# Patient Record
Sex: FEMALE
[De-identification: names, ages, dates, MRNs, and addresses within clinical notes are randomized per-mention and may not be internally consistent; named-entity substitution may affect disease eponyms.]

---

## 2024-02-20 ENCOUNTER — RESULT CHARGE (OUTPATIENT)
Age: 24
End: 2024-02-20

## 2024-02-20 ENCOUNTER — APPOINTMENT (OUTPATIENT)
Dept: OBGYN | Facility: CLINIC | Age: 24
End: 2024-02-20
Payer: MEDICAID

## 2024-02-20 VITALS
DIASTOLIC BLOOD PRESSURE: 73 MMHG | SYSTOLIC BLOOD PRESSURE: 123 MMHG | WEIGHT: 108 LBS | HEIGHT: 61 IN | BODY MASS INDEX: 20.39 KG/M2

## 2024-02-20 DIAGNOSIS — Z32.00 ENCOUNTER FOR PREGNANCY TEST, RESULT UNKNOWN: ICD-10-CM

## 2024-02-20 LAB
BILIRUB UR QL STRIP: NORMAL
GLUCOSE UR-MCNC: NORMAL
HCG UR QL: 0.2 EU/DL
HCG UR QL: POSITIVE
HGB UR QL STRIP.AUTO: NORMAL
KETONES UR-MCNC: NORMAL
LEUKOCYTE ESTERASE UR QL STRIP: NORMAL
NITRITE UR QL STRIP: NORMAL
PH UR STRIP: 7.5
PROT UR STRIP-MCNC: NORMAL
QUALITY CONTROL: YES
SP GR UR STRIP: 1.02

## 2024-02-20 PROCEDURE — 81003 URINALYSIS AUTO W/O SCOPE: CPT | Mod: QW

## 2024-02-20 PROCEDURE — 76801 OB US < 14 WKS SINGLE FETUS: CPT

## 2024-02-20 PROCEDURE — 99203 OFFICE O/P NEW LOW 30 MIN: CPT | Mod: 25

## 2024-02-20 PROCEDURE — 99385 PREV VISIT NEW AGE 18-39: CPT

## 2024-02-20 PROCEDURE — 81025 URINE PREGNANCY TEST: CPT

## 2024-02-20 PROCEDURE — 76815 OB US LIMITED FETUS(S): CPT

## 2024-02-20 NOTE — PROCEDURE
[Transabdominal OB Sonogram] : Transabdominal OB Sonogram [Intrauterine Pregnancy] : intrauterine pregnancy [Yolk Sac] : yolk sac present [Fetal Heart] : fetal heart present

## 2024-02-22 ENCOUNTER — NON-APPOINTMENT (OUTPATIENT)
Age: 24
End: 2024-02-22

## 2024-02-22 LAB
25(OH)D3 SERPL-MCNC: 27 NG/ML
ABO + RH PNL BLD: NORMAL
BACTERIA UR CULT: NORMAL
BASOPHILS # BLD AUTO: 0.05 K/UL
BASOPHILS NFR BLD AUTO: 0.5 %
BLD GP AB SCN SERPL QL: NORMAL
CMV IGG SERPL QL: 5 U/ML
CMV IGG SERPL-IMP: POSITIVE
CMV IGM SERPL QL: <8 AU/ML
CMV IGM SERPL QL: NEGATIVE
EOSINOPHIL # BLD AUTO: 0.02 K/UL
EOSINOPHIL NFR BLD AUTO: 0.2 %
ESTIMATED AVERAGE GLUCOSE: 97 MG/DL
FERRITIN SERPL-MCNC: 122 NG/ML
FOLATE SERPL-MCNC: >20 NG/ML
HBA1C MFR BLD HPLC: 5 %
HBV SURFACE AG SER QL: NONREACTIVE
HCT VFR BLD CALC: 38.9 %
HCV AB SER QL: NONREACTIVE
HCV S/CO RATIO: 0.11 S/CO
HGB A MFR BLD: 97.6 %
HGB A2 MFR BLD: 2.4 %
HGB BLD-MCNC: 12.9 G/DL
HGB FRACT BLD-IMP: NORMAL
HIV1+2 AB SPEC QL IA.RAPID: NONREACTIVE
IMM GRANULOCYTES NFR BLD AUTO: 0.3 %
IRON SATN MFR SERPL: 32 %
IRON SERPL-MCNC: 108 UG/DL
LEAD BLD-MCNC: <1 UG/DL
LYMPHOCYTES # BLD AUTO: 2.04 K/UL
LYMPHOCYTES NFR BLD AUTO: 20.1 %
MAN DIFF?: NORMAL
MCHC RBC-ENTMCNC: 27.7 PG
MCHC RBC-ENTMCNC: 33.2 GM/DL
MCV RBC AUTO: 83.7 FL
MEV IGG FLD QL IA: >300 AU/ML
MEV IGG+IGM SER-IMP: POSITIVE
MONOCYTES # BLD AUTO: 0.62 K/UL
MONOCYTES NFR BLD AUTO: 6.1 %
MUV AB SER-ACNC: POSITIVE
MUV IGG SER QL IA: 126 AU/ML
NEUTROPHILS # BLD AUTO: 7.38 K/UL
NEUTROPHILS NFR BLD AUTO: 72.8 %
PLATELET # BLD AUTO: 248 K/UL
RBC # BLD: 4.65 M/UL
RBC # FLD: 13.4 %
RUBV IGG FLD-ACNC: 13.5 INDEX
RUBV IGG SER-IMP: POSITIVE
T PALLIDUM AB SER QL IA: NEGATIVE
TIBC SERPL-MCNC: 333 UG/DL
TSH SERPL-ACNC: 1.01 UIU/ML
UIBC SERPL-MCNC: 225 UG/DL
VIT B12 SERPL-MCNC: 360 PG/ML
VZV AB TITR SER: POSITIVE
VZV IGG SER IF-ACNC: 863.7 INDEX
WBC # FLD AUTO: 10.14 K/UL

## 2024-02-22 RX ORDER — UBIDECARENONE/VIT E ACET 100MG-5
25 MCG CAPSULE ORAL
Qty: 30 | Refills: 5 | Status: ACTIVE | COMMUNITY
Start: 2024-02-22 | End: 1900-01-01

## 2024-02-23 ENCOUNTER — NON-APPOINTMENT (OUTPATIENT)
Age: 24
End: 2024-02-23

## 2024-02-23 LAB
B19V IGG SER QL IA: 6.89 INDEX
B19V IGG+IGM SER-IMP: NORMAL
B19V IGG+IGM SER-IMP: POSITIVE
B19V IGM FLD-ACNC: 0.14 INDEX
B19V IGM SER-ACNC: NEGATIVE

## 2024-02-27 NOTE — PLAN
[FreeTextEntry1] : crl on transabd sono 8.3 weeks edc 9/27/2024 to use vit to use basa no genetics no flu shot ' to rt in 1 month  to do 1hr gct at 13 weeks  discussed with pt close preg ie got preg when baby 6 months old - may feel sig lower abd and pelvic pressure and back ache

## 2024-02-27 NOTE — HISTORY OF PRESENT ILLNESS
[FreeTextEntry1] : 23 years old p1001 -  7.5lbs baby is 8mnths old  family hx - maternal gf brain tumor,paternal gm breast pt hx - neg  on pnv and folic acid pt got pregnant while nursing and was getting reg cycles - q4 weeks her lm summary of the first preg - at the 6month sugar test the 1 hr was elevated , pt couldnt tolerate the 3 hr so she was told to do finger sticks the numbers wnl , pt was watching diet- pt gained 20lbs in the pregnancy had a hematoma- at the beg only for very short time pt started having PIH at 38-39 weeks - induced 3 days before edc no pp hypertension, had fever in labor, baby went to nicu , strep neg  pt had vaccuum delivery

## 2024-03-29 ENCOUNTER — APPOINTMENT (OUTPATIENT)
Dept: OBGYN | Facility: CLINIC | Age: 24
End: 2024-03-29
Payer: MEDICAID

## 2024-03-29 VITALS — WEIGHT: 114 LBS | DIASTOLIC BLOOD PRESSURE: 82 MMHG | SYSTOLIC BLOOD PRESSURE: 125 MMHG

## 2024-03-29 LAB
BILIRUB UR QL STRIP: NORMAL
GLUCOSE UR-MCNC: NORMAL
HCG UR QL: 0.2 EU/DL
HGB UR QL STRIP.AUTO: NORMAL
KETONES UR-MCNC: NORMAL
LEUKOCYTE ESTERASE UR QL STRIP: NORMAL
NITRITE UR QL STRIP: NORMAL
PH UR STRIP: 7.5
PROT UR STRIP-MCNC: NORMAL
SP GR UR STRIP: 1.02

## 2024-03-29 PROCEDURE — 81003 URINALYSIS AUTO W/O SCOPE: CPT | Mod: NC,QW

## 2024-03-29 PROCEDURE — 0501F PRENATAL FLOW SHEET: CPT

## 2024-05-23 ENCOUNTER — APPOINTMENT (OUTPATIENT)
Dept: OBGYN | Facility: CLINIC | Age: 24
End: 2024-05-23
Payer: MEDICAID

## 2024-05-23 ENCOUNTER — TRANSCRIPTION ENCOUNTER (OUTPATIENT)
Age: 24
End: 2024-05-23

## 2024-05-23 VITALS — DIASTOLIC BLOOD PRESSURE: 77 MMHG | SYSTOLIC BLOOD PRESSURE: 126 MMHG | WEIGHT: 124 LBS

## 2024-05-23 DIAGNOSIS — Z34.90 ENCOUNTER FOR SUPERVISION OF NORMAL PREGNANCY, UNSPECIFIED, UNSPECIFIED TRIMESTER: ICD-10-CM

## 2024-05-23 LAB
BILIRUB UR QL STRIP: NORMAL
GLUCOSE UR-MCNC: NORMAL
HCG UR QL: 0.2 EU/DL
HGB UR QL STRIP.AUTO: NORMAL
KETONES UR-MCNC: NORMAL
LEUKOCYTE ESTERASE UR QL STRIP: NORMAL
NITRITE UR QL STRIP: NORMAL
PH UR STRIP: 6
PROT UR STRIP-MCNC: NORMAL
SP GR UR STRIP: 1.02

## 2024-05-23 PROCEDURE — 81003 URINALYSIS AUTO W/O SCOPE: CPT | Mod: NC,QW

## 2024-05-23 PROCEDURE — 0502F SUBSEQUENT PRENATAL CARE: CPT

## 2024-06-04 ENCOUNTER — NON-APPOINTMENT (OUTPATIENT)
Age: 24
End: 2024-06-04

## 2024-06-20 ENCOUNTER — NON-APPOINTMENT (OUTPATIENT)
Age: 24
End: 2024-06-20

## 2024-06-20 ENCOUNTER — APPOINTMENT (OUTPATIENT)
Dept: OBGYN | Facility: CLINIC | Age: 24
End: 2024-06-20
Payer: MEDICAID

## 2024-06-20 VITALS
DIASTOLIC BLOOD PRESSURE: 72 MMHG | SYSTOLIC BLOOD PRESSURE: 119 MMHG | HEIGHT: 61 IN | HEART RATE: 98 BPM | BODY MASS INDEX: 24.73 KG/M2 | WEIGHT: 131 LBS

## 2024-06-20 DIAGNOSIS — Z00.00 ENCOUNTER FOR GENERAL ADULT MEDICAL EXAMINATION W/OUT ABNORMAL FINDINGS: ICD-10-CM

## 2024-06-20 LAB
BILIRUB UR QL STRIP: NORMAL
GLUCOSE UR-MCNC: NORMAL
HCG UR QL: 0.2 EU/DL
HGB UR QL STRIP.AUTO: NORMAL
KETONES UR-MCNC: NORMAL
LEUKOCYTE ESTERASE UR QL STRIP: NORMAL
NITRITE UR QL STRIP: NORMAL
PH UR STRIP: 6
PROT UR STRIP-MCNC: NORMAL
SP GR UR STRIP: 1.01

## 2024-06-20 PROCEDURE — 81003 URINALYSIS AUTO W/O SCOPE: CPT | Mod: NC,QW

## 2024-06-20 PROCEDURE — 0502F SUBSEQUENT PRENATAL CARE: CPT

## 2024-06-21 LAB
GLUCOSE 1H P 50 G GLC PO SERPL-MCNC: 72 MG/DL
HCT VFR BLD CALC: 35.8 %
HGB BLD-MCNC: 11.4 G/DL
MCHC RBC-ENTMCNC: 28.6 PG
MCHC RBC-ENTMCNC: 31.8 GM/DL
MCV RBC AUTO: 89.9 FL
PLATELET # BLD AUTO: 155 K/UL
RBC # BLD: 3.98 M/UL
RBC # FLD: 14 %
WBC # FLD AUTO: 9.3 K/UL

## 2024-07-10 ENCOUNTER — NON-APPOINTMENT (OUTPATIENT)
Age: 24
End: 2024-07-10

## 2024-07-11 ENCOUNTER — APPOINTMENT (OUTPATIENT)
Dept: OBGYN | Facility: CLINIC | Age: 24
End: 2024-07-11
Payer: MEDICAID

## 2024-07-11 VITALS — SYSTOLIC BLOOD PRESSURE: 122 MMHG | WEIGHT: 131 LBS | DIASTOLIC BLOOD PRESSURE: 79 MMHG

## 2024-07-11 LAB
GLUCOSE UR-MCNC: NORMAL
HCG UR QL: 0.2 EU/DL
HGB UR QL STRIP.AUTO: NORMAL
KETONES UR-MCNC: NORMAL
LEUKOCYTE ESTERASE UR QL STRIP: NORMAL
NITRITE UR QL STRIP: NORMAL
PH UR STRIP: 7
PROT UR STRIP-MCNC: NORMAL
SP GR UR STRIP: 1.01

## 2024-07-11 PROCEDURE — 0502F SUBSEQUENT PRENATAL CARE: CPT

## 2024-07-11 PROCEDURE — 81003 URINALYSIS AUTO W/O SCOPE: CPT | Mod: NC,QW

## 2024-08-26 ENCOUNTER — RESULT CHARGE (OUTPATIENT)
Age: 24
End: 2024-08-26

## 2024-08-26 ENCOUNTER — APPOINTMENT (OUTPATIENT)
Dept: OBGYN | Facility: CLINIC | Age: 24
End: 2024-08-26
Payer: MEDICAID

## 2024-08-26 VITALS — WEIGHT: 133 LBS | HEART RATE: 118 BPM | SYSTOLIC BLOOD PRESSURE: 134 MMHG | DIASTOLIC BLOOD PRESSURE: 84 MMHG

## 2024-08-26 DIAGNOSIS — Z34.90 ENCOUNTER FOR SUPERVISION OF NORMAL PREGNANCY, UNSPECIFIED, UNSPECIFIED TRIMESTER: ICD-10-CM

## 2024-08-26 LAB
BILIRUB UR QL STRIP: NORMAL
GLUCOSE UR-MCNC: NORMAL
HCG UR QL: 0.2 EU/DL
HGB UR QL STRIP.AUTO: NORMAL
KETONES UR-MCNC: NORMAL
LEUKOCYTE ESTERASE UR QL STRIP: NORMAL
NITRITE UR QL STRIP: NORMAL
PH UR STRIP: 6
PROT UR STRIP-MCNC: NORMAL
SP GR UR STRIP: 1

## 2024-08-26 PROCEDURE — 0502F SUBSEQUENT PRENATAL CARE: CPT

## 2024-08-26 PROCEDURE — 81003 URINALYSIS AUTO W/O SCOPE: CPT | Mod: NC,QW

## 2024-09-05 ENCOUNTER — APPOINTMENT (OUTPATIENT)
Dept: OBGYN | Facility: CLINIC | Age: 24
End: 2024-09-05
Payer: MEDICAID

## 2024-09-05 VITALS
BODY MASS INDEX: 26.43 KG/M2 | HEIGHT: 61 IN | DIASTOLIC BLOOD PRESSURE: 85 MMHG | HEART RATE: 111 BPM | SYSTOLIC BLOOD PRESSURE: 125 MMHG | WEIGHT: 140 LBS

## 2024-09-05 LAB
BILIRUB UR QL STRIP: NORMAL
GLUCOSE UR-MCNC: NORMAL
HCG UR QL: 0.2 EU/DL
HGB UR QL STRIP.AUTO: NORMAL
KETONES UR-MCNC: NORMAL
LEUKOCYTE ESTERASE UR QL STRIP: NORMAL
NITRITE UR QL STRIP: NORMAL
PH UR STRIP: 6
PROT UR STRIP-MCNC: NORMAL
SP GR UR STRIP: <=1.005

## 2024-09-05 PROCEDURE — 0502F SUBSEQUENT PRENATAL CARE: CPT

## 2024-09-05 PROCEDURE — 81003 URINALYSIS AUTO W/O SCOPE: CPT | Mod: NC,QW

## 2024-09-06 LAB
BASOPHILS # BLD AUTO: 0.05 K/UL
BASOPHILS NFR BLD AUTO: 0.4 %
EOSINOPHIL # BLD AUTO: 0.19 K/UL
EOSINOPHIL NFR BLD AUTO: 1.6 %
HCT VFR BLD CALC: 36.1 %
HGB BLD-MCNC: 11.3 G/DL
HIV1+2 AB SPEC QL IA.RAPID: NONREACTIVE
IMM GRANULOCYTES NFR BLD AUTO: 0.3 %
LYMPHOCYTES # BLD AUTO: 2.39 K/UL
LYMPHOCYTES NFR BLD AUTO: 20.4 %
MAN DIFF?: NORMAL
MCHC RBC-ENTMCNC: 28.4 PG
MCHC RBC-ENTMCNC: 31.3 GM/DL
MCV RBC AUTO: 90.7 FL
MONOCYTES # BLD AUTO: 0.79 K/UL
MONOCYTES NFR BLD AUTO: 6.7 %
NEUTROPHILS # BLD AUTO: 8.29 K/UL
NEUTROPHILS NFR BLD AUTO: 70.6 %
PLATELET # BLD AUTO: 137 K/UL
RBC # BLD: 3.98 M/UL
RBC # FLD: 13.5 %
WBC # FLD AUTO: 11.74 K/UL

## 2024-09-07 LAB — T PALLIDUM AB SER QL IA: NEGATIVE

## 2024-09-08 LAB — B-HEM STREP SPEC QL CULT: NORMAL

## 2024-09-12 ENCOUNTER — APPOINTMENT (OUTPATIENT)
Dept: OBGYN | Facility: CLINIC | Age: 24
End: 2024-09-12
Payer: MEDICAID

## 2024-09-12 VITALS
HEART RATE: 105 BPM | WEIGHT: 140 LBS | SYSTOLIC BLOOD PRESSURE: 120 MMHG | BODY MASS INDEX: 26.43 KG/M2 | HEIGHT: 61 IN | DIASTOLIC BLOOD PRESSURE: 77 MMHG

## 2024-09-12 PROCEDURE — 76816 OB US FOLLOW-UP PER FETUS: CPT

## 2024-09-12 PROCEDURE — 81003 URINALYSIS AUTO W/O SCOPE: CPT | Mod: NC,QW

## 2024-09-12 PROCEDURE — 76819 FETAL BIOPHYS PROFIL W/O NST: CPT | Mod: 59

## 2024-09-12 PROCEDURE — 0502F SUBSEQUENT PRENATAL CARE: CPT

## 2024-09-13 LAB
ALBUMIN SERPL ELPH-MCNC: 3.9 G/DL
ALP BLD-CCNC: 155 U/L
ALT SERPL-CCNC: 12 U/L
ANION GAP SERPL CALC-SCNC: 13 MMOL/L
AST SERPL-CCNC: 20 U/L
BASOPHILS # BLD AUTO: 0.03 K/UL
BASOPHILS NFR BLD AUTO: 0.3 %
BILIRUB SERPL-MCNC: 0.3 MG/DL
BUN SERPL-MCNC: 8 MG/DL
CALCIUM SERPL-MCNC: 9.2 MG/DL
CHLORIDE SERPL-SCNC: 104 MMOL/L
CO2 SERPL-SCNC: 21 MMOL/L
CREAT SERPL-MCNC: 0.44 MG/DL
CREAT SPEC-SCNC: 83 MG/DL
CREAT/PROT UR: 0.1 RATIO
EGFR: 139 ML/MIN/1.73M2
EOSINOPHIL # BLD AUTO: 0.1 K/UL
EOSINOPHIL NFR BLD AUTO: 1 %
GLUCOSE SERPL-MCNC: 89 MG/DL
HCT VFR BLD CALC: 37.9 %
HGB BLD-MCNC: 11.7 G/DL
IMM GRANULOCYTES NFR BLD AUTO: 0.5 %
LYMPHOCYTES # BLD AUTO: 1.91 K/UL
LYMPHOCYTES NFR BLD AUTO: 19.8 %
MAN DIFF?: NORMAL
MCHC RBC-ENTMCNC: 28.7 PG
MCHC RBC-ENTMCNC: 30.9 GM/DL
MCV RBC AUTO: 93.1 FL
MONOCYTES # BLD AUTO: 0.72 K/UL
MONOCYTES NFR BLD AUTO: 7.5 %
NEUTROPHILS # BLD AUTO: 6.84 K/UL
NEUTROPHILS NFR BLD AUTO: 70.9 %
PLATELET # BLD AUTO: 134 K/UL
POTASSIUM SERPL-SCNC: 3.7 MMOL/L
PROT SERPL-MCNC: 6.7 G/DL
PROT UR-MCNC: 10 MG/DL
RBC # BLD: 4.07 M/UL
RBC # FLD: 13.9 %
SODIUM SERPL-SCNC: 138 MMOL/L
WBC # FLD AUTO: 9.65 K/UL

## 2024-09-19 ENCOUNTER — APPOINTMENT (OUTPATIENT)
Dept: OBGYN | Facility: CLINIC | Age: 24
End: 2024-09-19
Payer: MEDICAID

## 2024-09-19 ENCOUNTER — RESULT CHARGE (OUTPATIENT)
Age: 24
End: 2024-09-19

## 2024-09-19 VITALS
HEIGHT: 61 IN | WEIGHT: 140 LBS | SYSTOLIC BLOOD PRESSURE: 129 MMHG | HEART RATE: 120 BPM | DIASTOLIC BLOOD PRESSURE: 82 MMHG | BODY MASS INDEX: 26.43 KG/M2

## 2024-09-19 LAB
BILIRUB UR QL STRIP: NORMAL
GLUCOSE UR-MCNC: NORMAL
HCG UR QL: 0.2 EU/DL
HGB UR QL STRIP.AUTO: NORMAL
KETONES UR-MCNC: NORMAL
LEUKOCYTE ESTERASE UR QL STRIP: NORMAL
NITRITE UR QL STRIP: NORMAL
PH UR STRIP: 6.5
PROT UR STRIP-MCNC: NORMAL
SP GR UR STRIP: 1.01

## 2024-09-19 PROCEDURE — 76819 FETAL BIOPHYS PROFIL W/O NST: CPT

## 2024-09-19 PROCEDURE — 0502F SUBSEQUENT PRENATAL CARE: CPT

## 2024-09-19 PROCEDURE — 81003 URINALYSIS AUTO W/O SCOPE: CPT | Mod: NC,QW

## 2024-09-23 ENCOUNTER — INPATIENT (INPATIENT)
Facility: HOSPITAL | Age: 24
LOS: 0 days | Discharge: HOME | DRG: 566 | End: 2024-09-24
Attending: SPECIALIST | Admitting: SPECIALIST
Payer: MEDICAID

## 2024-09-23 VITALS
SYSTOLIC BLOOD PRESSURE: 124 MMHG | HEART RATE: 115 BPM | TEMPERATURE: 98 F | RESPIRATION RATE: 17 BRPM | DIASTOLIC BLOOD PRESSURE: 71 MMHG

## 2024-09-23 DIAGNOSIS — O26.899 OTHER SPECIFIED PREGNANCY RELATED CONDITIONS, UNSPECIFIED TRIMESTER: ICD-10-CM

## 2024-09-23 DIAGNOSIS — O42.92 FULL-TERM PREMATURE RUPTURE OF MEMBRANES, UNSPECIFIED AS TO LENGTH OF TIME BETWEEN RUPTURE AND ONSET OF LABOR: ICD-10-CM

## 2024-09-23 LAB
APPEARANCE UR: CLEAR — SIGNIFICANT CHANGE UP
BASOPHILS # BLD AUTO: 0.06 K/UL — SIGNIFICANT CHANGE UP (ref 0–0.2)
BASOPHILS NFR BLD AUTO: 0.5 % — SIGNIFICANT CHANGE UP (ref 0–1)
BILIRUB UR-MCNC: NEGATIVE — SIGNIFICANT CHANGE UP
COLOR SPEC: YELLOW — SIGNIFICANT CHANGE UP
DIFF PNL FLD: NEGATIVE — SIGNIFICANT CHANGE UP
EOSINOPHIL # BLD AUTO: 0.09 K/UL — SIGNIFICANT CHANGE UP (ref 0–0.7)
EOSINOPHIL NFR BLD AUTO: 0.7 % — SIGNIFICANT CHANGE UP (ref 0–8)
GLUCOSE UR QL: NEGATIVE MG/DL — SIGNIFICANT CHANGE UP
HCT VFR BLD CALC: 35.4 % — LOW (ref 37–47)
HGB BLD-MCNC: 11.9 G/DL — LOW (ref 12–16)
IMM GRANULOCYTES NFR BLD AUTO: 0.4 % — HIGH (ref 0.1–0.3)
KETONES UR-MCNC: NEGATIVE MG/DL — SIGNIFICANT CHANGE UP
L&D DRUG SCREEN, URINE: SIGNIFICANT CHANGE UP
LEUKOCYTE ESTERASE UR-ACNC: ABNORMAL
LYMPHOCYTES # BLD AUTO: 2.74 K/UL — SIGNIFICANT CHANGE UP (ref 1.2–3.4)
LYMPHOCYTES # BLD AUTO: 21.5 % — SIGNIFICANT CHANGE UP (ref 20.5–51.1)
MCHC RBC-ENTMCNC: 29.1 PG — SIGNIFICANT CHANGE UP (ref 27–31)
MCHC RBC-ENTMCNC: 33.6 G/DL — SIGNIFICANT CHANGE UP (ref 32–37)
MCV RBC AUTO: 86.6 FL — SIGNIFICANT CHANGE UP (ref 81–99)
MONOCYTES # BLD AUTO: 0.8 K/UL — HIGH (ref 0.1–0.6)
MONOCYTES NFR BLD AUTO: 6.3 % — SIGNIFICANT CHANGE UP (ref 1.7–9.3)
NEUTROPHILS # BLD AUTO: 9.03 K/UL — HIGH (ref 1.4–6.5)
NEUTROPHILS NFR BLD AUTO: 70.6 % — SIGNIFICANT CHANGE UP (ref 42.2–75.2)
NITRITE UR-MCNC: NEGATIVE — SIGNIFICANT CHANGE UP
NRBC # BLD: 0 /100 WBCS — SIGNIFICANT CHANGE UP (ref 0–0)
PH UR: 6 — SIGNIFICANT CHANGE UP (ref 5–8)
PLATELET # BLD AUTO: 146 K/UL — SIGNIFICANT CHANGE UP (ref 130–400)
PMV BLD: 11.8 FL — HIGH (ref 7.4–10.4)
PRENATAL SYPHILIS TEST: SIGNIFICANT CHANGE UP
PROT UR-MCNC: NEGATIVE MG/DL — SIGNIFICANT CHANGE UP
RBC # BLD: 4.09 M/UL — LOW (ref 4.2–5.4)
RBC # FLD: 13.1 % — SIGNIFICANT CHANGE UP (ref 11.5–14.5)
SP GR SPEC: 1.02 — SIGNIFICANT CHANGE UP (ref 1–1.03)
UROBILINOGEN FLD QL: 0.2 MG/DL — SIGNIFICANT CHANGE UP (ref 0.2–1)
WBC # BLD: 12.77 K/UL — HIGH (ref 4.8–10.8)
WBC # FLD AUTO: 12.77 K/UL — HIGH (ref 4.8–10.8)

## 2024-09-23 PROCEDURE — 59050 FETAL MONITOR W/REPORT: CPT

## 2024-09-23 PROCEDURE — 80354 DRUG SCREENING FENTANYL: CPT

## 2024-09-23 PROCEDURE — 86592 SYPHILIS TEST NON-TREP QUAL: CPT

## 2024-09-23 PROCEDURE — 80307 DRUG TEST PRSMV CHEM ANLYZR: CPT

## 2024-09-23 PROCEDURE — 86900 BLOOD TYPING SEROLOGIC ABO: CPT

## 2024-09-23 PROCEDURE — 59400 OBSTETRICAL CARE: CPT | Mod: U9

## 2024-09-23 PROCEDURE — 86850 RBC ANTIBODY SCREEN: CPT

## 2024-09-23 PROCEDURE — 36415 COLL VENOUS BLD VENIPUNCTURE: CPT

## 2024-09-23 PROCEDURE — 81001 URINALYSIS AUTO W/SCOPE: CPT

## 2024-09-23 PROCEDURE — 85025 COMPLETE CBC W/AUTO DIFF WBC: CPT

## 2024-09-23 PROCEDURE — 86901 BLOOD TYPING SEROLOGIC RH(D): CPT

## 2024-09-23 RX ORDER — SODIUM CHLORIDE 0.9 % (FLUSH) 0.9 %
3 SYRINGE (ML) INJECTION EVERY 8 HOURS
Refills: 0 | Status: DISCONTINUED | OUTPATIENT
Start: 2024-09-23 | End: 2024-09-24

## 2024-09-23 RX ORDER — PRAMOXINE HYDROCHLORIDE 10 MG/ML
1 LOTION TOPICAL EVERY 4 HOURS
Refills: 0 | Status: DISCONTINUED | OUTPATIENT
Start: 2024-09-23 | End: 2024-09-24

## 2024-09-23 RX ORDER — OXYCODONE HYDROCHLORIDE 30 MG/1
5 TABLET, FILM COATED, EXTENDED RELEASE ORAL ONCE
Refills: 0 | Status: DISCONTINUED | OUTPATIENT
Start: 2024-09-23 | End: 2024-09-24

## 2024-09-23 RX ORDER — PRENATAL VIT,CAL 76/IRON/FOLIC 29 MG-1 MG
1 TABLET ORAL DAILY
Refills: 0 | Status: DISCONTINUED | OUTPATIENT
Start: 2024-09-23 | End: 2024-09-24

## 2024-09-23 RX ORDER — OXYCODONE HYDROCHLORIDE 30 MG/1
5 TABLET, FILM COATED, EXTENDED RELEASE ORAL
Refills: 0 | Status: DISCONTINUED | OUTPATIENT
Start: 2024-09-23 | End: 2024-09-24

## 2024-09-23 RX ORDER — ACETAMINOPHEN 325 MG
975 TABLET ORAL
Refills: 0 | Status: DISCONTINUED | OUTPATIENT
Start: 2024-09-23 | End: 2024-09-24

## 2024-09-23 RX ORDER — DIBUCAINE 1 %
1 OINTMENT (GRAM) TOPICAL EVERY 6 HOURS
Refills: 0 | Status: DISCONTINUED | OUTPATIENT
Start: 2024-09-23 | End: 2024-09-24

## 2024-09-23 RX ORDER — ANTI-ITCH CREAM 1 G/100G
1 OINTMENT TOPICAL EVERY 6 HOURS
Refills: 0 | Status: DISCONTINUED | OUTPATIENT
Start: 2024-09-23 | End: 2024-09-24

## 2024-09-23 RX ORDER — DIPHENHYDRAMINE HCL 12.5MG/5ML
25 LIQUID (ML) ORAL EVERY 6 HOURS
Refills: 0 | Status: DISCONTINUED | OUTPATIENT
Start: 2024-09-23 | End: 2024-09-24

## 2024-09-23 RX ORDER — SODIUM CITRATE AND CITRIC ACID MONOHYDRATE 334; 500 MG/5ML; MG/5ML
15 SOLUTION ORAL EVERY 6 HOURS
Refills: 0 | Status: DISCONTINUED | OUTPATIENT
Start: 2024-09-23 | End: 2024-09-23

## 2024-09-23 RX ORDER — SODIUM CHLORIDE IRRIG SOLUTION 0.9 %
1000 SOLUTION, IRRIGATION IRRIGATION
Refills: 0 | Status: DISCONTINUED | OUTPATIENT
Start: 2024-09-23 | End: 2024-09-23

## 2024-09-23 RX ORDER — SOAP/LANOLIN
1 BAR TOPICAL EVERY 4 HOURS
Refills: 0 | Status: DISCONTINUED | OUTPATIENT
Start: 2024-09-23 | End: 2024-09-24

## 2024-09-23 RX ORDER — OXYTOCIN/RINGER'S LACTATE 20/500ML
167 PLASTIC BAG, INJECTION (ML) INTRAVENOUS
Qty: 30 | Refills: 0 | Status: DISCONTINUED | OUTPATIENT
Start: 2024-09-23 | End: 2024-09-24

## 2024-09-23 RX ORDER — TETANUS TOXOID, REDUCED DIPHTHERIA TOXOID AND ACELLULAR PERTUSSIS VACCINE, ADSORBED 5; 2.5; 8; 8; 2.5 [IU]/.5ML; [IU]/.5ML; UG/.5ML; UG/.5ML; UG/.5ML
0.5 SUSPENSION INTRAMUSCULAR ONCE
Refills: 0 | Status: DISCONTINUED | OUTPATIENT
Start: 2024-09-23 | End: 2024-09-24

## 2024-09-23 RX ORDER — MAGNESIUM HYDROXIDE 400 MG/5ML
30 SUSPENSION, ORAL (FINAL DOSE FORM) ORAL
Refills: 0 | Status: DISCONTINUED | OUTPATIENT
Start: 2024-09-23 | End: 2024-09-24

## 2024-09-23 RX ORDER — KETOROLAC TROMETHAMINE 10 MG/1
30 TABLET, FILM COATED ORAL ONCE
Refills: 0 | Status: DISCONTINUED | OUTPATIENT
Start: 2024-09-23 | End: 2024-09-23

## 2024-09-23 RX ADMIN — Medication 975 MILLIGRAM(S): at 21:30

## 2024-09-23 RX ADMIN — Medication 3 MILLILITER(S): at 22:15

## 2024-09-23 RX ADMIN — Medication 975 MILLIGRAM(S): at 20:48

## 2024-09-23 RX ADMIN — Medication 3 MILLILITER(S): at 14:26

## 2024-09-23 NOTE — OB PROVIDER H&P - NSHPLABSRESULTS_GEN_ALL_CORE
Type and Screen: O pos  Antibody screen: neg   Rubella: immune  Varicella: immune   RPR: neg  HbSAg: neg  HIV: NR    Second Trimester:  1 hour Glucola: 72    Third Trimester:  HIV: NR  GBS: neg    sonos:  37w6d- vertex, anterior placenta, MVP 4.5cm, EFW 3236g, BPP 8/8

## 2024-09-23 NOTE — OB PROVIDER H&P - NS_OBGYNHISTORY_OBGYN_ALL_OB_FT
Ob hx:   P1- VAVD, 7-5, IOL 2/2 gHTN    Gyn hx: denies h/o abnormal paps, STIs, ovarian cysts, uterine fibroids, endometriosis

## 2024-09-23 NOTE — PRE-ANESTHESIA EVALUATION ADULT - NSANTHOSAYNRD_GEN_A_CORE
No. AISHWARYA screening performed.  STOP BANG Legend: 0-2 = LOW Risk; 3-4 = INTERMEDIATE Risk; 5-8 = HIGH Risk

## 2024-09-23 NOTE — OB RN DELIVERY SUMMARY - NS_CORDBLDGASA_OBGYN_ALL_OB
Impression: Presence of pseudophakia: Z96.1.  Plan: clear well centered lens Both arterial and venous

## 2024-09-23 NOTE — OB RN PATIENT PROFILE - AS PAIN REST
patient is awake, alert, appears in moderate respiratory distress with accessory muscle use
5 (moderate pain)

## 2024-09-23 NOTE — PROCEDURAL SAFETY CHECKLIST WITH OR WITHOUT SEDATION - NSPX2BRECORDED_GEN_ALL_CORE
Called daughter in request from patient to go over surgery details, for case scheduled on Tuesday May 3rd, to call office back to discuss.    epidural

## 2024-09-23 NOTE — OB PROVIDER H&P - HISTORY OF PRESENT ILLNESS
23yo  at 39w3d, JOSEPH 24 by LMP c/w first trimester sono, presenting to L&D with contractions since 1900 q5-7mins.  Reports good fetal movement.  Denies LOF, VB.  GBS negative.    Pregnancy c/b:    #gestational thrombocytopenia  -plts 134 on     #h/o gHTN in prior pregnancy  -no elevated BPs in this pregnancy  -PELs wnl drawn during prenatal visit

## 2024-09-23 NOTE — OB PROVIDER H&P - NSHPPHYSICALEXAM_GEN_ALL_CORE
Physical exam:    Vital Signs Last 24 Hrs  T(F): 97.9 (23 Sep 2024 03:49), Max: 97.9 (23 Sep 2024 03:19)  HR: 115 (23 Sep 2024 03:49) (115 - 115)  BP: 124/71 (23 Sep 2024 03:49) (124/71 - 124/71)  RR: 17 (23 Sep 2024 03:49) (17 - 17)    Gen: AAOx3, NAD  Abdomen: Soft, nontender, no distension, gravid  Ext: No calf tenderness, no swelling    EFM: 130/mod/+accels  toco: q4-5m  SVE: 5/70/-2  BSS: vertex, posterior placenta  EFW by leopold: 3400

## 2024-09-23 NOTE — OB PROVIDER H&P - NSLOWPPHRISK_OBGYN_A_OB
No previous uterine incision/[place] Pregnancy/Less than or equal to 4 previous vaginal births/No known bleeding disorder/No history of postpartum hemorrhage/No other PPH risks indicated

## 2024-09-23 NOTE — PROCEDURE NOTE - ADDITIONAL PROCEDURE DETAILS
1000 Post Labor  Epidural/ Delivery  Evaluation Note:    Uncomplicated anesthetic for Vaginal Delivery.    Patient seen at bedside. Epidural to be removed by RN before patient transfer.  Patient moving B/L lower extremities.  Motor block appropriate and resolving. Vital Signs are stable. Pain well controlled.     Mirtha Score greater than 9    Mental Status:  __x__ Awake   ___x__ Alert   _____ Drowsy   _____ Sedated    Nausea/Vomiting:  __x__ NO  ______Yes,   See Post - Op Orders          Pain Scale (0-10):  _____    Treatment: __X__ None       Plan: Discharge:   ____Home       __X___Floor     _____Critical Care    _____

## 2024-09-23 NOTE — OB PROVIDER DELIVERY SUMMARY - NSPROVIDERDELIVERYNOTE_OBGYN_ALL_OB_FT
Patient was fully dilated and pushing. Fetal head was OA and restituted to ROT. The anterior and posterior shoulders delivered, followed by the remaining body atraumatically. The  was handed to the mother and RN.  Delayed cord clamping was performed, and then clamped and cut. Cord blood gases collected x2. The placenta delivered intact with membranes. Pitocin was administered. Uterus massaged, fundus found to be firm. Cervix, vagina and perineum inspected a second degree perineal laceration and a right vaginal sulcus tear were noted, repaired using 2-0 chromic in the usual fashion with good hemostasis.     Viable female infant delivered, with APGARs 9/9    Laceration: second degree and right vaginal sulcus tear   ml    Dr. Leo present for the delivery

## 2024-09-23 NOTE — OB RN PATIENT PROFILE - HEIGHT IN CM
154.94 Quality 226: Preventive Care And Screening: Tobacco Use: Screening And Cessation Intervention: Patient screened for tobacco use and is an ex/non-smoker Quality 128: Preventive Care And Screening: Body Mass Index (Bmi) Screening And Follow-Up Plan: BMI is documented within normal parameters and no follow-up plan is required. Quality 131: Pain Assessment And Follow-Up: Pain assessment using a standardized tool is documented as negative, no follow-up plan required Quality 130: Documentation Of Current Medications In The Medical Record: Current Medications Documented Quality 111:Pneumonia Vaccination Status For Older Adults: Pneumococcal Vaccination Previously Received Quality 110: Preventive Care And Screening: Influenza Immunization: Influenza Immunization Administered during Influenza season Detail Level: Detailed

## 2024-09-23 NOTE — OB PROVIDER H&P - ASSESSMENT
24y  at 39w3d, GBS negative , in labor at term.    - admit to L&D  - f/u admission labs  - cont EFM and toco  - pain management PRN  - IV hydration, clear liquid diet  - monitor vitals  - to receive epidural    Discussed with Dr. Leo and Dr. Otto

## 2024-09-23 NOTE — OB RN DELIVERY SUMMARY - NSSELHIDDEN_OBGYN_ALL_OB_FT
[NS_DeliveryAttending1_OBGYN_ALL_OB_FT:[email or web address]=],[NS_DeliveryRN_OBGYN_ALL_OB_FT:[id number]=]

## 2024-09-23 NOTE — OB RN PATIENT PROFILE - FALL HARM RISK - UNIVERSAL INTERVENTIONS
Bed in lowest position, wheels locked, appropriate side rails in place/Call bell, personal items and telephone in reach/Instruct patient to call for assistance before getting out of bed or chair/Non-slip footwear when patient is out of bed/[place] to call system/Physically safe environment - no spills, clutter or unnecessary equipment/Purposeful Proactive Rounding/Room/bathroom lighting operational, light cord in reach

## 2024-09-23 NOTE — OB PROVIDER DELIVERY SUMMARY - NSSELHIDDEN_OBGYN_ALL_OB_FT
[NS_DeliveryAttending1_OBGYN_ALL_OB_FT:[email or web address]=],[NS_DeliveryRN_OBGYN_ALL_OB_FT:[id number]=]

## 2024-09-24 ENCOUNTER — TRANSCRIPTION ENCOUNTER (OUTPATIENT)
Age: 24
End: 2024-09-24

## 2024-09-24 VITALS
HEART RATE: 79 BPM | DIASTOLIC BLOOD PRESSURE: 67 MMHG | SYSTOLIC BLOOD PRESSURE: 103 MMHG | RESPIRATION RATE: 18 BRPM | TEMPERATURE: 98 F | OXYGEN SATURATION: 98 %

## 2024-09-24 DIAGNOSIS — Z34.90 ENCOUNTER FOR SUPERVISION OF NORMAL PREGNANCY, UNSPECIFIED, UNSPECIFIED TRIMESTER: ICD-10-CM

## 2024-09-24 DIAGNOSIS — Z00.00 ENCOUNTER FOR GENERAL ADULT MEDICAL EXAMINATION W/OUT ABNORMAL FINDINGS: ICD-10-CM

## 2024-09-24 LAB
BASOPHILS # BLD AUTO: 0.06 K/UL — SIGNIFICANT CHANGE UP (ref 0–0.2)
BASOPHILS NFR BLD AUTO: 0.5 % — SIGNIFICANT CHANGE UP (ref 0–1)
EOSINOPHIL # BLD AUTO: 0.22 K/UL — SIGNIFICANT CHANGE UP (ref 0–0.7)
EOSINOPHIL NFR BLD AUTO: 1.9 % — SIGNIFICANT CHANGE UP (ref 0–8)
HCT VFR BLD CALC: 30.4 % — LOW (ref 37–47)
HGB BLD-MCNC: 9.9 G/DL — LOW (ref 12–16)
IMM GRANULOCYTES NFR BLD AUTO: 0.3 % — SIGNIFICANT CHANGE UP (ref 0.1–0.3)
LYMPHOCYTES # BLD AUTO: 2.99 K/UL — SIGNIFICANT CHANGE UP (ref 1.2–3.4)
LYMPHOCYTES # BLD AUTO: 25.6 % — SIGNIFICANT CHANGE UP (ref 20.5–51.1)
MCHC RBC-ENTMCNC: 29 PG — SIGNIFICANT CHANGE UP (ref 27–31)
MCHC RBC-ENTMCNC: 32.6 G/DL — SIGNIFICANT CHANGE UP (ref 32–37)
MCV RBC AUTO: 89.1 FL — SIGNIFICANT CHANGE UP (ref 81–99)
MONOCYTES # BLD AUTO: 0.81 K/UL — HIGH (ref 0.1–0.6)
MONOCYTES NFR BLD AUTO: 6.9 % — SIGNIFICANT CHANGE UP (ref 1.7–9.3)
NEUTROPHILS # BLD AUTO: 7.57 K/UL — HIGH (ref 1.4–6.5)
NEUTROPHILS NFR BLD AUTO: 64.8 % — SIGNIFICANT CHANGE UP (ref 42.2–75.2)
NRBC # BLD: 0 /100 WBCS — SIGNIFICANT CHANGE UP (ref 0–0)
PLATELET # BLD AUTO: 129 K/UL — LOW (ref 130–400)
PMV BLD: 11.7 FL — HIGH (ref 7.4–10.4)
RBC # BLD: 3.41 M/UL — LOW (ref 4.2–5.4)
RBC # FLD: 13.3 % — SIGNIFICANT CHANGE UP (ref 11.5–14.5)
WBC # BLD: 11.68 K/UL — HIGH (ref 4.8–10.8)
WBC # FLD AUTO: 11.68 K/UL — HIGH (ref 4.8–10.8)

## 2024-09-24 RX ORDER — IBUPROFEN 600 MG/1
600 TABLET ORAL EVERY 6 HOURS
Qty: 30 | Refills: 0 | Status: ACTIVE | COMMUNITY
Start: 2024-09-24 | End: 1900-01-01

## 2024-09-24 RX ORDER — ACETAMINOPHEN 325 MG/1
325 TABLET ORAL
Qty: 60 | Refills: 0 | Status: ACTIVE | COMMUNITY
Start: 2024-09-24 | End: 1900-01-01

## 2024-09-24 RX ORDER — PRENATAL VIT NO.126/IRON/FOLIC 28MG-0.8MG
28-0.8 TABLET ORAL DAILY
Qty: 100 | Refills: 0 | Status: ACTIVE | COMMUNITY
Start: 2024-09-24 | End: 1900-01-01

## 2024-09-24 RX ORDER — PRENATAL VIT,CAL 76/IRON/FOLIC 29 MG-1 MG
1 TABLET ORAL
Qty: 0 | Refills: 0 | DISCHARGE
Start: 2024-09-24

## 2024-09-24 RX ORDER — ACETAMINOPHEN 325 MG
3 TABLET ORAL
Qty: 0 | Refills: 0 | DISCHARGE
Start: 2024-09-24

## 2024-09-24 RX ADMIN — Medication 600 MILLIGRAM(S): at 17:24

## 2024-09-24 RX ADMIN — Medication 600 MILLIGRAM(S): at 06:54

## 2024-09-24 RX ADMIN — Medication 975 MILLIGRAM(S): at 20:41

## 2024-09-24 RX ADMIN — Medication 3 MILLILITER(S): at 14:31

## 2024-09-24 RX ADMIN — Medication 3 MILLILITER(S): at 06:54

## 2024-09-24 RX ADMIN — Medication 600 MILLIGRAM(S): at 06:13

## 2024-09-24 RX ADMIN — Medication 975 MILLIGRAM(S): at 20:46

## 2024-09-24 RX ADMIN — Medication 1 TABLET(S): at 17:37

## 2024-09-24 NOTE — DISCHARGE NOTE OB - CARE PLAN
1 Principal Discharge DX:	Term pregnancy delivered  Assessment and plan of treatment:	Discharge home today, Nothing per vagina x 6 wks (no sex, tampons or douching), Followup 5-6 wks for post-partum care, If you experience severe pain, heavy vaginal bleeding or fever call your doctor

## 2024-09-24 NOTE — DISCHARGE NOTE OB - HOSPITAL COURSE
DATE OF DISCHARGE: 24 @ 11:25    HISTORY OF PRESENT ILLNESS/HOSPITAL COURSE: HPI:  23yo  at 39w3d, JOSEPH 24 by LMP c/w first trimester sono, presenting to L&D with contractions since 1900 q5-7mins.  Reports good fetal movement.  Denies LOF, VB.  GBS negative.    Pregnancy c/b:    #gestational thrombocytopenia  -plts 134 on     #h/o gHTN in prior pregnancy  -no elevated BPs in this pregnancy  -PELs wnl drawn during prenatal visit (23 Sep 2024 03:55)    PAST MEDICAL & SURGICAL HISTORY:  No pertinent past medical history      No significant past surgical history          LABS:                       9.9    11.68 )-----------( 129      ( 24 Sep 2024 06:31 )             30.4

## 2024-09-24 NOTE — PROGRESS NOTE ADULT - SUBJECTIVE AND OBJECTIVE BOX
Subjective:   Patient doing well. No complaints. Minimal lochia. Pain controlled.    Objective:   T(F): 98.4 ( @ 23:37), Max: 98.4 ( @ 23:37)  HR: 82 ( @ 23:37)  BP: 99/60 ( @ 23:37) (95/52 - 148/67)  RR: 18 ( @ 23:37)  SpO2: 96% ( @ 23:37) (84% - 100%)  Gen: AAOx3, NAD  Abd: Soft, Nontender, Nondistended, Fundus firm below the umbilicus  Ext: no tender, mild edema  Min Lochia Rubra    Labs:                        11.9   12.77 )-----------( 146      ( 23 Sep 2024 04:15 )             35.4             Tolerating regular diet  Passed flatus, passed bowel movement  Breast/Bottle feeding    Assessment:   24y s/p , PPD#1, doing well    Plan:  -Routine postpartum care  -Encouraged ambulation and PO hydration  -Tolerating regular diet

## 2024-09-24 NOTE — DISCHARGE NOTE OB - CARE PROVIDER_API CALL
Baljinder Pond  Obstetrics and Gynecology  [address] [place] [address]  Phone: [phone]  Fax: [phone]  Follow Up Time:

## 2024-09-24 NOTE — DISCHARGE NOTE OB - NS MD DC FALL RISK RISK
For information on Fall & Injury Prevention, visit: [email or web address] OR  [email or web address] OR  https://www.cdc.gov/steadi/patient.html

## 2024-09-24 NOTE — DISCHARGE NOTE OB - PATIENT PORTAL LINK FT
You can access the FollowMyHealth Patient Portal offered by [facility] by registering at the following website: [email or web address]. By joining [employer]’s FollowMyHealth portal, you will also be able to view your health information using other applications (apps) compatible with our system.

## 2024-09-25 ENCOUNTER — APPOINTMENT (OUTPATIENT)
Dept: OBGYN | Facility: CLINIC | Age: 24
End: 2024-09-25

## 2024-10-01 ENCOUNTER — NON-APPOINTMENT (OUTPATIENT)
Age: 24
End: 2024-10-01

## 2024-10-02 RX ORDER — PRENATAL WITH FERROUS FUM AND FOLIC ACID 3080; 920; 120; 400; 22; 1.84; 3; 20; 10; 1; 12; 200; 27; 25; 2 [IU]/1; [IU]/1; MG/1; [IU]/1; MG/1; MG/1; MG/1; MG/1; MG/1; MG/1; UG/1; MG/1; MG/1; MG/1; MG/1
27-1 TABLET ORAL
Qty: 30 | Refills: 5 | Status: ACTIVE | COMMUNITY
Start: 2024-10-01 | End: 1900-01-01

## 2024-10-03 DIAGNOSIS — D69.6 THROMBOCYTOPENIA, UNSPECIFIED: ICD-10-CM

## 2024-10-03 DIAGNOSIS — Z28.09 IMMUNIZATION NOT CARRIED OUT BECAUSE OF OTHER CONTRAINDICATION: ICD-10-CM

## 2024-10-03 DIAGNOSIS — Z3A.39 39 WEEKS GESTATION OF PREGNANCY: ICD-10-CM

## 2024-11-11 ENCOUNTER — APPOINTMENT (OUTPATIENT)
Dept: OBGYN | Facility: CLINIC | Age: 24
End: 2024-11-11

## 2024-11-20 ENCOUNTER — APPOINTMENT (OUTPATIENT)
Dept: OBGYN | Facility: CLINIC | Age: 24
End: 2024-11-20
Payer: MEDICAID

## 2024-11-20 ENCOUNTER — NON-APPOINTMENT (OUTPATIENT)
Age: 24
End: 2024-11-20

## 2024-11-20 VITALS — SYSTOLIC BLOOD PRESSURE: 127 MMHG | WEIGHT: 122 LBS | DIASTOLIC BLOOD PRESSURE: 80 MMHG | HEART RATE: 82 BPM

## 2024-11-20 PROCEDURE — 0503F POSTPARTUM CARE VISIT: CPT

## 2024-11-21 LAB
C TRACH RRNA SPEC QL NAA+PROBE: NOT DETECTED
N GONORRHOEA RRNA SPEC QL NAA+PROBE: NOT DETECTED
SOURCE TP AMPLIFICATION: NORMAL

## 2024-11-25 LAB — CYTOLOGY CVX/VAG DOC THIN PREP: NORMAL

## 2025-02-28 ENCOUNTER — APPOINTMENT (OUTPATIENT)
Dept: OBGYN | Facility: CLINIC | Age: 25
End: 2025-02-28
Payer: MEDICAID

## 2025-02-28 VITALS — DIASTOLIC BLOOD PRESSURE: 83 MMHG | SYSTOLIC BLOOD PRESSURE: 146 MMHG | HEART RATE: 110 BPM

## 2025-02-28 VITALS — WEIGHT: 110 LBS | DIASTOLIC BLOOD PRESSURE: 90 MMHG | SYSTOLIC BLOOD PRESSURE: 136 MMHG | HEART RATE: 125 BPM

## 2025-02-28 DIAGNOSIS — Z32.00 ENCOUNTER FOR PREGNANCY TEST, RESULT UNKNOWN: ICD-10-CM

## 2025-02-28 DIAGNOSIS — N93.9 ABNORMAL UTERINE AND VAGINAL BLEEDING, UNSPECIFIED: ICD-10-CM

## 2025-02-28 LAB
BILIRUB UR QL STRIP: NORMAL
GLUCOSE UR-MCNC: NORMAL
HCG UR QL: 0.2 EU/DL
HCG UR QL: NEGATIVE
HGB UR QL STRIP.AUTO: NORMAL
KETONES UR-MCNC: NORMAL
LEUKOCYTE ESTERASE UR QL STRIP: NORMAL
NITRITE UR QL STRIP: NORMAL
PH UR STRIP: 5.5
PROT UR STRIP-MCNC: NORMAL
SP GR UR STRIP: 1.01

## 2025-02-28 PROCEDURE — 81003 URINALYSIS AUTO W/O SCOPE: CPT | Mod: QW

## 2025-02-28 PROCEDURE — 81025 URINE PREGNANCY TEST: CPT

## 2025-02-28 PROCEDURE — 99213 OFFICE O/P EST LOW 20 MIN: CPT | Mod: 25

## 2025-02-28 PROCEDURE — 56605 BIOPSY OF VULVA/PERINEUM: CPT

## 2025-03-05 LAB — CORE LAB BIOPSY: NORMAL

## 2025-05-21 NOTE — PROCEDURE NOTE - NS_PARA_OBGYN_ALL_OB_NU
Caller: SYLVIE,MILLIE    Relationship: Emergency Contact    Best call back number:        Requested Prescriptions:   Requested Prescriptions     Pending Prescriptions Disp Refills    pantoprazole (PROTONIX) 40 MG EC tablet 90 tablet 1     Sig: Take 1 tablet by mouth Daily.        Pharmacy where request should be sent: [id number] - [place], [place] - [address] AT [facility] [address]  - [phone]  - [phone] FX     Last office visit with prescribing clinician: 5/1/2025   Last telemedicine visit with prescribing clinician: Visit date not found   Next office visit with prescribing clinician: 9/2/2025     Additional details provided by patient:      Does the patient have less than a 3 day supply:  [] Yes  [x] No    Would you like a call back once the refill request has been completed: [] Yes [x] No    If the office needs to give you a call back, can they leave a voicemail: [] Yes [x] No    Denilson Delatorre Rep   05/21/25 09:31 EDT     1